# Patient Record
Sex: MALE | Race: WHITE | Employment: UNEMPLOYED | URBAN - METROPOLITAN AREA
[De-identification: names, ages, dates, MRNs, and addresses within clinical notes are randomized per-mention and may not be internally consistent; named-entity substitution may affect disease eponyms.]

---
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
## 2024-02-21 ENCOUNTER — TELEPHONE (OUTPATIENT)
Age: 19
End: 2024-02-21

## 2024-02-21 NOTE — TELEPHONE ENCOUNTER
Called in response to pt request for appointment; see notes:     If pt calls back, schedule an appt w/ an adult provider, per Dr. Odonnell.    Preferred Date Range: 2/22/2024 - 2/22/2024     Preferred Times: Any Time     Reason for visit: Request an Appointment     Comments:  STD testing

## 2024-05-02 DIAGNOSIS — F32.A DEPRESSION, UNSPECIFIED DEPRESSION TYPE: ICD-10-CM

## 2024-05-02 DIAGNOSIS — F41.9 ANXIETY: ICD-10-CM

## 2024-05-02 RX ORDER — BUPROPION HYDROCHLORIDE 150 MG/1
TABLET ORAL
Qty: 90 TABLET | Refills: 0 | Status: SHIPPED | OUTPATIENT
Start: 2024-05-02

## 2024-05-02 NOTE — TELEPHONE ENCOUNTER
I will refill one more time but pt needs to establish care with adult provider now that he is 19 thanks

## 2024-05-02 NOTE — TELEPHONE ENCOUNTER
Pt left a voice message requesting a refill. Per pt is completely out of medication and has no ne to take tomorrow. Requesting refill as soon as possible.     [phone]    Last appointment: 12/20/2023 MD Odonnell   Next appointment: Pt canceled 05/03/2024 - Nothing rescheduled   Previous refill encounter(s):   12/20/2023 Wellbutrin-XL #30 with 3 refills.     For Pharmacy Admin Tracking Only    Program: Medication Refill  Intervention Detail: New Rx: 1, reason: Patient Preference  Time Spent (min): 5    Requested Prescriptions     Pending Prescriptions Disp Refills    buPROPion (WELLBUTRIN XL) 150 MG extended release tablet [Pharmacy Med Name: BUPROPION  MG TAB[**]] 90 tablet 0     Sig: TAKE ONE TABLET BY MOUTH ONE TIME DAILY IN THE MORNING

## 2024-06-25 ENCOUNTER — TELEPHONE (OUTPATIENT)
Age: 19
End: 2024-06-25

## 2024-06-25 NOTE — TELEPHONE ENCOUNTER
Patient came into office looking for college vaccination form from provider. Form could not be located, I called patient and pulled college form from web to be filled out by provider. Will call patient when form is completed.     Rosie Lopez LPN

## 2024-12-10 ENCOUNTER — OFFICE VISIT (OUTPATIENT)
Age: 19
End: 2024-12-10
Payer: COMMERCIAL

## 2024-12-10 VITALS
WEIGHT: 186 LBS | RESPIRATION RATE: 16 BRPM | DIASTOLIC BLOOD PRESSURE: 60 MMHG | HEART RATE: 61 BPM | SYSTOLIC BLOOD PRESSURE: 102 MMHG | HEIGHT: 73 IN | OXYGEN SATURATION: 100 % | BODY MASS INDEX: 24.65 KG/M2 | TEMPERATURE: 98 F

## 2024-12-10 DIAGNOSIS — Z76.89 ENCOUNTER TO ESTABLISH CARE: ICD-10-CM

## 2024-12-10 DIAGNOSIS — E23.0 GROWTH HORMONE DEFICIENCY (HCC): ICD-10-CM

## 2024-12-10 DIAGNOSIS — F32.A DEPRESSION, UNSPECIFIED DEPRESSION TYPE: ICD-10-CM

## 2024-12-10 DIAGNOSIS — F41.9 ANXIETY: ICD-10-CM

## 2024-12-10 DIAGNOSIS — Z00.00 WELL ADULT EXAM: Primary | ICD-10-CM

## 2024-12-10 DIAGNOSIS — N52.2 DRUG-INDUCED ERECTILE DYSFUNCTION: ICD-10-CM

## 2024-12-10 PROBLEM — F90.9 ADHD: Status: ACTIVE | Noted: 2024-06-17

## 2024-12-10 PROBLEM — F90.9 ADHD: Status: RESOLVED | Noted: 2024-06-17 | Resolved: 2024-12-10

## 2024-12-10 PROCEDURE — 99395 PREV VISIT EST AGE 18-39: CPT | Performed by: STUDENT IN AN ORGANIZED HEALTH CARE EDUCATION/TRAINING PROGRAM

## 2024-12-10 RX ORDER — SERTRALINE HYDROCHLORIDE 100 MG/1
100 TABLET, FILM COATED ORAL DAILY
Qty: 90 TABLET | Refills: 3 | Status: SHIPPED | OUTPATIENT
Start: 2024-12-10

## 2024-12-10 ASSESSMENT — ENCOUNTER SYMPTOMS
COUGH: 0
SORE THROAT: 0
SHORTNESS OF BREATH: 0

## 2024-12-10 ASSESSMENT — PATIENT HEALTH QUESTIONNAIRE - PHQ9
10. IF YOU CHECKED OFF ANY PROBLEMS, HOW DIFFICULT HAVE THESE PROBLEMS MADE IT FOR YOU TO DO YOUR WORK, TAKE CARE OF THINGS AT HOME, OR GET ALONG WITH OTHER PEOPLE: NOT DIFFICULT AT ALL
SUM OF ALL RESPONSES TO PHQ QUESTIONS 1-9: 0
8. MOVING OR SPEAKING SO SLOWLY THAT OTHER PEOPLE COULD HAVE NOTICED. OR THE OPPOSITE, BEING SO FIGETY OR RESTLESS THAT YOU HAVE BEEN MOVING AROUND A LOT MORE THAN USUAL: NOT AT ALL
5. POOR APPETITE OR OVEREATING: NOT AT ALL
6. FEELING BAD ABOUT YOURSELF - OR THAT YOU ARE A FAILURE OR HAVE LET YOURSELF OR YOUR FAMILY DOWN: NOT AT ALL
9. THOUGHTS THAT YOU WOULD BE BETTER OFF DEAD, OR OF HURTING YOURSELF: NOT AT ALL
SUM OF ALL RESPONSES TO PHQ QUESTIONS 1-9: 0
1. LITTLE INTEREST OR PLEASURE IN DOING THINGS: NOT AT ALL
2. FEELING DOWN, DEPRESSED OR HOPELESS: NOT AT ALL
4. FEELING TIRED OR HAVING LITTLE ENERGY: NOT AT ALL
SUM OF ALL RESPONSES TO PHQ9 QUESTIONS 1 & 2: 0
3. TROUBLE FALLING OR STAYING ASLEEP: NOT AT ALL
7. TROUBLE CONCENTRATING ON THINGS, SUCH AS READING THE NEWSPAPER OR WATCHING TELEVISION: NOT AT ALL

## 2024-12-10 NOTE — PROGRESS NOTES
[facility]    Assessment and Plan   Tomas Martinez is a 19 y.o. male who presents for Establish Care (Pt is here to establish care. Would like to have the doctor refill anxiety medications)     Diagnosis Orders   1. Well adult exam        2. Encounter to establish care        3. Anxiety  sertraline (ZOLOFT) 100 MG tablet      4. Depression, unspecified depression type  sertraline (ZOLOFT) 100 MG tablet      5. Growth hormone deficiency (HCC)        6. Drug-induced erectile dysfunction           1, 2: Patient presents to establish care visit with me.  Acute concerns addressed.  Chronic problems reviewed.  Medications and history reviewed.  Health maintenance reviewed and updated.  See above for additional information.  3, 4: Chronic, well-controlled.  Continue current regimen.  Refilled sertraline 100 mg daily.  5: Noted from history.  6: Resolved with decrease in sertraline dose.    Click Here for Release of Records Request     Return in about 1 year (around 12/10/2025) for Annual Physical.     Subjective   Tomas Martinez is a 19 y.o. male who presents for Establish Care (Pt is here to establish care. Would like to have the doctor refill anxiety medications)    Establish Care  Medical problems include:  Anxiety: on sertraline 100mg daily; tried Wellbutrin, did not help; sees Psych through [facility]  ED has resolved with this medication regimen  Tried to wean before, does better with medicine  Diet: some junk/fast food, no sweet drinks, wants more veggies  Exercise: gym daily  Tobacco:  reports that he has never smoked. He has never used smokeless tobacco.   Alcohol:  reports no history of alcohol use.   Drugs:  reports no history of drug use.   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     Review of

## 2025-06-12 ENCOUNTER — OFFICE VISIT (OUTPATIENT)
Age: 20
End: 2025-06-12

## 2025-06-12 VITALS
BODY MASS INDEX: 25.77 KG/M2 | RESPIRATION RATE: 16 BRPM | WEIGHT: 195.3 LBS | DIASTOLIC BLOOD PRESSURE: 70 MMHG | TEMPERATURE: 98 F | SYSTOLIC BLOOD PRESSURE: 129 MMHG | HEART RATE: 66 BPM | OXYGEN SATURATION: 98 %

## 2025-06-12 DIAGNOSIS — L03.113 CELLULITIS OF RIGHT ELBOW: Primary | ICD-10-CM

## 2025-06-12 PROBLEM — R89.9 UNSPECIFIED ABNORMAL FINDING IN SPECIMENS FROM OTHER ORGANS, SYSTEMS AND TISSUES: Status: ACTIVE | Noted: 2024-04-19

## 2025-06-12 PROBLEM — R23.8 SKIN IRRITATION: Status: ACTIVE | Noted: 2025-04-10

## 2025-06-12 PROBLEM — S62.91XA HAND FRACTURE, RIGHT: Status: ACTIVE | Noted: 2025-04-10

## 2025-06-12 RX ORDER — DOXYCYCLINE HYCLATE 100 MG
100 TABLET ORAL 2 TIMES DAILY
Qty: 20 TABLET | Refills: 0 | Status: SHIPPED | OUTPATIENT
Start: 2025-06-12 | End: 2025-06-22

## 2025-06-12 RX ORDER — BUPROPION HYDROCHLORIDE 150 MG/1
150 TABLET ORAL DAILY
COMMUNITY
Start: 2025-05-01 | End: 2025-10-28

## 2025-06-12 RX ORDER — DICLOFENAC SODIUM 75 MG/1
75 TABLET, DELAYED RELEASE ORAL 2 TIMES DAILY
COMMUNITY
Start: 2025-03-22

## 2025-06-12 NOTE — PATIENT INSTRUCTIONS
Take antibiotic as directed. May apply a compressive ACE bandage but not restrictive and don't wear all the time. Rest and elevate the affected painful area.  Apply cold compresses intermittently as needed.  As pain recedes, begin normal activities slowly as tolerated.  Follow up with PCP and/or orthopedic if no improvement or worsening.  Go to ER if fever > 100.4, fatigue, body aches, worsening of swelling, red streaks, worsening of redness around joint, or any concerning symptoms

## 2025-06-12 NOTE — PROGRESS NOTES
Patient Name: Tomas Martinez   YOB: 2005   Patient Status: New patient,   Chief Complaint: Joint Swelling (Right elbow swelling, redness )      ____________________________________________________________________________________________    ASSESSMENT/PLAN:    Clinical Impression:   Assessment & Plan  Cellulitis of right elbow                 MDM: This patient presents with initial presentation of local erythema, warmth, swelling concerning for cellulitis posterior elbow. Sensitivity/mild pain to light touch around the erythematous area. No lymphangitic spread visible. Possible small area of fluctuance over olecranon, possible bursitis. He is afebrile without systemic symptoms. Low concern for osteomyelitis or DVT.  Patient to be discharged home with doxycyline   Patient given return and ED precautions and encouraged to follow up with PCP in 3-5 days or for persistent or recurrent symptoms. Patient verbalized understanding and agreement with care plan.       External Records Reviewed: None    Limitation to History: None    Outside Historian: None    SUBJECTIVE/OBJECTIVE:  Tomas Martinez is a 20 y.o. male presents with complaint of right elbow swelling, redness and warmth with mild discomfort.  Symptoms began 1 week(s) ago and are worsening since onset. He reports a pimple  over olecranon a little over a week ago that popped. He denies trauma.  Patient describes pain as dull 1/10 in severity, intermittent, and without radiation.  Symptoms are aggravated by touching and alleviated by nothing.  The patient denies fever or any systemic symptoms. Patient reports taking nothing for symptoms.  No other acute symptoms reported at this time.          PAST MEDICAL HISTORY:   Medical: Pt  has a past medical history of ADHD, Anxiety, Constitutional delay of growth and development, Depression, OCD (obsessive compulsive disorder), and OCD (obsessive compulsive disorder).  Surgical: Pt  has no past surgical history on